# Patient Record
Sex: MALE | URBAN - METROPOLITAN AREA
[De-identification: names, ages, dates, MRNs, and addresses within clinical notes are randomized per-mention and may not be internally consistent; named-entity substitution may affect disease eponyms.]

---

## 2021-06-10 ENCOUNTER — TRANSCRIBE ORDER (OUTPATIENT)
Dept: SCHEDULING | Age: 51
End: 2021-06-10

## 2021-09-23 ENCOUNTER — OFFICE VISIT (OUTPATIENT)
Dept: HEMATOLOGY | Age: 51
End: 2021-09-23

## 2021-09-23 VITALS
HEART RATE: 50 BPM | WEIGHT: 182 LBS | BODY MASS INDEX: 27.58 KG/M2 | RESPIRATION RATE: 13 BRPM | TEMPERATURE: 96.9 F | OXYGEN SATURATION: 98 % | SYSTOLIC BLOOD PRESSURE: 121 MMHG | HEIGHT: 68 IN | DIASTOLIC BLOOD PRESSURE: 76 MMHG

## 2021-09-23 DIAGNOSIS — R74.8 ELEVATED LIVER ENZYMES: Primary | ICD-10-CM

## 2021-09-23 PROBLEM — E78.00 HYPERCHOLESTEREMIA: Status: ACTIVE | Noted: 2021-09-23

## 2021-09-23 PROCEDURE — 91200 LIVER ELASTOGRAPHY: CPT | Performed by: NURSE PRACTITIONER

## 2021-09-23 PROCEDURE — 99214 OFFICE O/P EST MOD 30 MIN: CPT | Performed by: NURSE PRACTITIONER

## 2021-09-23 RX ORDER — ERGOCALCIFEROL 1.25 MG/1
CAPSULE ORAL
COMMUNITY
Start: 2021-08-02

## 2021-09-23 NOTE — PROGRESS NOTES
Curtis Shahid MD, Graff, Cite Ximena Trevino, Marycruz Bowers MD, MPH      Juan Hernandez, PA-TATI Michele, ACNP-BC     Chiara Fernandez, [facility]   Adria Pierce, P-C    Mary Zimmerman, [facility]       Jonhsandra De Leon [facility] [address]    at [address], [address] [place] [address], [facility].    [phone]    FAX: [address] [place] [address], [address]    [phone]    FAX: [phone]     Patient Care Team:  Jesus Hoffman MD as PCP - General (Gastroenterology)  Jesus Hoffman MD as Referring Provider (Gastroenterology)       Problem List  Never Reviewed        Codes Class Noted    Hypercholesteremia ICD-10-CM: E78.00  ICD-9-CM: 272.0  9/23/2021            The physicians listed above have asked me to see Mariano Gudino to perform assessment of fibrosis by means of in-office fibroscan analysis. The patient is a 53 y.o. male who was found to have abnormal liver enzymes in 4/2021. Assessment of liver fibrosis with Fibroscan was performed in the office today. The result was 5.4 kPa which correlates with   no fibrosis. The CAP score of 268 suggests hepatic steatosis. The patient does not have any symptoms which could be attributed to the liver disorder. The patient is not experiencing the following symptoms which are commonly seen in liver disorders:   fatigue, pain in the right side over the liver. The patient completes all daily activities without any functional limitations. ASSESSMENT AND PLAN:  Assessment of fibrosis was made through performance of fibroscan in the office today. The results of the analysis were shared with the patient in the office at the time of the procedure.      A copy of the report was provided to the patient and will be forwarded to the referring medical practice. All questions were answered. The patient expressed a clear understanding of the above. ALLERGIES:  No Known Allergies    MEDICATIONS:  Current Outpatient Medications   Medication Sig    ergocalciferol (ERGOCALCIFEROL) 1,250 mcg (50,000 unit) capsule      No current facility-administered medications for this visit. SYSTEM REVIEW NOT RELATED TO LIVER DISEASE OR REVIEWED ABOVE:  Constitution systems: Negative for fever, chills, weight gain, weight loss. Eyes: Negative for visual changes. ENT: Negative for sore throat, painful swallowing. Respiratory: Negative for cough, hemoptysis, SOB. Cardiology: Negative for chest pain, palpitations. GI:  Negative for constipation or diarrhea. : Negative for urinary frequency, dysuria, hematuria, nocturia. Skin: Negative for rash. Hematology: Negative for easy bruising, blood clots. Musculo-skelatal: Negative for back pain, muscle pain, weakness. Neurologic: Negative for headaches, dizziness, vertigo, memory problems not related to HE. Psychology: Negative for anxiety, depression. FAMILY HISTORY:  The father  Has/had the following following chronic disease(s): CKD. The mother Has/had the following chronic disease(s): none. There is no family history of liver disease. SOCIAL HISTORY:  The patient is . The patient has 4 children  The patient currently smokes 1 pack of tobacco daily. The patient has never consumed significant amounts of alcohol. The patient currently works full time as construction. PHYSICAL EXAMINATION:  Visit Vitals  /76 (BP 1 Location: Left upper arm, BP Patient Position: Sitting, BP Cuff Size: Small adult)   Pulse (!) 50   Temp 96.9 °F (36.1 °C)   Resp 13   Ht 5' 8\" (1.727 m)   Wt 182 lb (82.6 kg)   SpO2 98%   BMI 27.67 kg/m²     General: No acute distress. Skin: No spider angiomata. No jaundice.   No palmar erythema. Abdomen: Soft non-tender. Liver size normal to percussion/palpation. Spleen not palpable. No obvious ascites. Extremities: No edema. No muscle wasting. No gross arthritic changes. Neurologic: Alert and oriented. Cranial nerves grossly intact. No asterixis. LABORATORY STUDIES:  Recent liver function panel, CBC with platelet count and BMP are not available. SEROLOGIES:  Not available or performed. Testing will be performed as indicated. LIVER HISTOLOGY:  9/2021. FibroScan performed at [address]. EkPa was 5.5. IQR/med 15%. . The results suggested a fibrosis level of F0. The CAP score suggests there is hepatic steatosis. ENDOSCOPIC PROCEDURES:  Not available or performed    RADIOLOGY:  Not available or performed    OTHER TESTING:  Not available or performed    FOLLOW-UP:  All of the issues listed above in the Assessment and Plan were discussed with the patient. All questions were answered. The patient expressed a clear understanding of the above. The patient will follow-up with the referring physician.       PRABHU Gonzalez-BC  [address] [facility] [place] [facility] [address], [facility] [address]  [address] [facility] [address] [facility], [address]  [phone]